# Patient Record
Sex: MALE | Race: WHITE | URBAN - METROPOLITAN AREA
[De-identification: names, ages, dates, MRNs, and addresses within clinical notes are randomized per-mention and may not be internally consistent; named-entity substitution may affect disease eponyms.]

---

## 2021-08-10 ENCOUNTER — APPOINTMENT (RX ONLY)
Dept: URBAN - METROPOLITAN AREA CLINIC 123 | Facility: CLINIC | Age: 76
Setting detail: DERMATOLOGY
End: 2021-08-10

## 2021-08-10 DIAGNOSIS — L21.8 OTHER SEBORRHEIC DERMATITIS: ICD-10-CM

## 2021-08-10 DIAGNOSIS — L82.1 OTHER SEBORRHEIC KERATOSIS: ICD-10-CM

## 2021-08-10 DIAGNOSIS — D22 MELANOCYTIC NEVI: ICD-10-CM

## 2021-08-10 DIAGNOSIS — L85.3 XEROSIS CUTIS: ICD-10-CM

## 2021-08-10 DIAGNOSIS — D18.0 HEMANGIOMA: ICD-10-CM

## 2021-08-10 DIAGNOSIS — L57.0 ACTINIC KERATOSIS: ICD-10-CM

## 2021-08-10 DIAGNOSIS — L81.4 OTHER MELANIN HYPERPIGMENTATION: ICD-10-CM | Status: STABLE

## 2021-08-10 DIAGNOSIS — L40.0 PSORIASIS VULGARIS: ICD-10-CM

## 2021-08-10 PROBLEM — D22.9 MELANOCYTIC NEVI, UNSPECIFIED: Status: ACTIVE | Noted: 2021-08-10

## 2021-08-10 PROBLEM — D18.01 HEMANGIOMA OF SKIN AND SUBCUTANEOUS TISSUE: Status: ACTIVE | Noted: 2021-08-10

## 2021-08-10 PROCEDURE — ? SUNSCREEN RECOMMENDATIONS

## 2021-08-10 PROCEDURE — ? COUNSELING

## 2021-08-10 PROCEDURE — 99204 OFFICE O/P NEW MOD 45 MIN: CPT | Mod: 25

## 2021-08-10 PROCEDURE — ? PRESCRIPTION

## 2021-08-10 PROCEDURE — ? ADDITIONAL NOTES

## 2021-08-10 PROCEDURE — ? LIQUID NITROGEN

## 2021-08-10 PROCEDURE — ? FULL BODY SKIN EXAM

## 2021-08-10 PROCEDURE — 17004 DESTROY PREMAL LESIONS 15/>: CPT

## 2021-08-10 RX ORDER — HYDROCORTISONE 25 MG/G
CREAM TOPICAL BID
Qty: 1 | Refills: 6 | Status: ERX | COMMUNITY
Start: 2021-08-10

## 2021-08-10 RX ORDER — BETAMETHASONE DIPROPIONATE 0.5 MG/G
CREAM, AUGMENTED TOPICAL BID
Qty: 2 | Refills: 5 | Status: ERX | COMMUNITY
Start: 2021-08-10

## 2021-08-10 RX ORDER — KETOCONAZOLE 20 MG/G
CREAM TOPICAL BID
Qty: 1 | Refills: 11 | Status: ERX | COMMUNITY
Start: 2021-08-10

## 2021-08-10 RX ADMIN — HYDROCORTISONE: 25 CREAM TOPICAL at 00:00

## 2021-08-10 RX ADMIN — BETAMETHASONE DIPROPIONATE: 0.5 CREAM, AUGMENTED TOPICAL at 00:00

## 2021-08-10 RX ADMIN — KETOCONAZOLE: 20 CREAM TOPICAL at 00:00

## 2021-08-10 ASSESSMENT — LOCATION DETAILED DESCRIPTION DERM
LOCATION DETAILED: LEFT CENTRAL PARIETAL SCALP
LOCATION DETAILED: LEFT POSTERIOR SHOULDER
LOCATION DETAILED: RIGHT POSTERIOR PARIETAL SCALP
LOCATION DETAILED: MID-OCCIPITAL SCALP
LOCATION DETAILED: LEFT SUPERIOR PARIETAL SCALP
LOCATION DETAILED: RIGHT SUPERIOR MEDIAL FOREHEAD
LOCATION DETAILED: RIGHT RIB CAGE
LOCATION DETAILED: LEFT SUPERIOR FOREHEAD
LOCATION DETAILED: RIGHT SUPERIOR OCCIPITAL SCALP
LOCATION DETAILED: LEFT SUPERIOR MEDIAL FOREHEAD
LOCATION DETAILED: RIGHT PROXIMAL DORSAL FOREARM
LOCATION DETAILED: SUPERIOR MID FOREHEAD
LOCATION DETAILED: RIGHT SUPERIOR PARIETAL SCALP
LOCATION DETAILED: RIGHT MID-UPPER BACK
LOCATION DETAILED: LEFT ELBOW
LOCATION DETAILED: GLABELLA
LOCATION DETAILED: LEFT MEDIAL MALAR CHEEK

## 2021-08-10 ASSESSMENT — LOCATION ZONE DERM
LOCATION ZONE: SCALP
LOCATION ZONE: FACE
LOCATION ZONE: TRUNK
LOCATION ZONE: ARM

## 2021-08-10 ASSESSMENT — LOCATION SIMPLE DESCRIPTION DERM
LOCATION SIMPLE: RIGHT FOREHEAD
LOCATION SIMPLE: LEFT ELBOW
LOCATION SIMPLE: SCALP
LOCATION SIMPLE: RIGHT OCCIPITAL SCALP
LOCATION SIMPLE: RIGHT UPPER BACK
LOCATION SIMPLE: GLABELLA
LOCATION SIMPLE: LEFT SHOULDER
LOCATION SIMPLE: POSTERIOR SCALP
LOCATION SIMPLE: RIGHT FOREARM
LOCATION SIMPLE: SUPERIOR FOREHEAD
LOCATION SIMPLE: ABDOMEN
LOCATION SIMPLE: LEFT FOREHEAD
LOCATION SIMPLE: LEFT CHEEK

## 2021-08-10 NOTE — PROCEDURE: LIQUID NITROGEN
Number Of Freeze-Thaw Cycles: 3 freeze-thaw cycles
Render Note In Bullet Format When Appropriate: No
Show Applicator Variable?: Yes
Duration Of Freeze Thaw-Cycle (Seconds): 8
Detail Level: Detailed
Consent: The patient's consent was obtained including but not limited to risks of crusting, scabbing, blistering, scarring, darker or lighter pigmentary change, recurrence, incomplete removal and infection.
Post-Care Instructions: I reviewed with the patient in detail post-care instructions. Patient is to wear sunprotection, and avoid picking at any of the treated lesions. Pt may apply Vaseline to crusted or scabbing areas.

## 2022-02-09 ENCOUNTER — APPOINTMENT (RX ONLY)
Dept: URBAN - METROPOLITAN AREA CLINIC 123 | Facility: CLINIC | Age: 77
Setting detail: DERMATOLOGY
End: 2022-02-09

## 2022-02-09 DIAGNOSIS — D18.0 HEMANGIOMA: ICD-10-CM

## 2022-02-09 DIAGNOSIS — L81.4 OTHER MELANIN HYPERPIGMENTATION: ICD-10-CM | Status: STABLE

## 2022-02-09 DIAGNOSIS — L21.8 OTHER SEBORRHEIC DERMATITIS: ICD-10-CM | Status: WELL CONTROLLED

## 2022-02-09 DIAGNOSIS — L85.3 XEROSIS CUTIS: ICD-10-CM

## 2022-02-09 DIAGNOSIS — D22 MELANOCYTIC NEVI: ICD-10-CM

## 2022-02-09 DIAGNOSIS — L82.1 OTHER SEBORRHEIC KERATOSIS: ICD-10-CM

## 2022-02-09 DIAGNOSIS — L40.0 PSORIASIS VULGARIS: ICD-10-CM | Status: WELL CONTROLLED

## 2022-02-09 PROBLEM — D22.9 MELANOCYTIC NEVI, UNSPECIFIED: Status: ACTIVE | Noted: 2022-02-09

## 2022-02-09 PROBLEM — D18.01 HEMANGIOMA OF SKIN AND SUBCUTANEOUS TISSUE: Status: ACTIVE | Noted: 2022-02-09

## 2022-02-09 PROCEDURE — ? PRESCRIPTION MEDICATION MANAGEMENT

## 2022-02-09 PROCEDURE — ? FULL BODY SKIN EXAM

## 2022-02-09 PROCEDURE — ? COUNSELING

## 2022-02-09 PROCEDURE — ? ADDITIONAL NOTES

## 2022-02-09 PROCEDURE — ? SUNSCREEN RECOMMENDATIONS

## 2022-02-09 PROCEDURE — ? MEDICATION COUNSELING

## 2022-02-09 PROCEDURE — 99214 OFFICE O/P EST MOD 30 MIN: CPT

## 2022-02-09 ASSESSMENT — LOCATION SIMPLE DESCRIPTION DERM
LOCATION SIMPLE: RIGHT HAND
LOCATION SIMPLE: LEFT HAND
LOCATION SIMPLE: LEFT SHOULDER
LOCATION SIMPLE: GLABELLA
LOCATION SIMPLE: RIGHT UPPER BACK
LOCATION SIMPLE: ABDOMEN
LOCATION SIMPLE: LEFT CHEEK

## 2022-02-09 ASSESSMENT — LOCATION ZONE DERM
LOCATION ZONE: TRUNK
LOCATION ZONE: FACE
LOCATION ZONE: ARM
LOCATION ZONE: HAND

## 2022-02-09 ASSESSMENT — LOCATION DETAILED DESCRIPTION DERM
LOCATION DETAILED: GLABELLA
LOCATION DETAILED: RIGHT RIB CAGE
LOCATION DETAILED: RIGHT MID-UPPER BACK
LOCATION DETAILED: LEFT POSTERIOR SHOULDER
LOCATION DETAILED: 2ND WEB SPACE RIGHT HAND
LOCATION DETAILED: LEFT MEDIAL MALAR CHEEK
LOCATION DETAILED: LEFT DORSAL MIDDLE METACARPOPHALANGEAL JOINT

## 2022-02-09 NOTE — PROCEDURE: MEDICATION COUNSELING
Xeljessiez Pregnancy And Lactation Text: This medication is Pregnancy Category D and is not considered safe during pregnancy.  The risk during breast feeding is also uncertain. Xelejssiez Pregnancy And Lactation Text: This medication is Pregnancy Category D and is not considered safe during pregnancy.  The risk during breast feeding is also uncertain.

## 2022-02-09 NOTE — PROCEDURE: PRESCRIPTION MEDICATION MANAGEMENT
Continue Regimen: Augmented Betamethasone
Detail Level: Zone
Render In Strict Bullet Format?: No
Continue Regimen: Ketoconazole and Hydrocortisone Cream

## 2022-08-09 ENCOUNTER — APPOINTMENT (RX ONLY)
Dept: URBAN - METROPOLITAN AREA CLINIC 123 | Facility: CLINIC | Age: 77
Setting detail: DERMATOLOGY
End: 2022-08-09

## 2022-08-09 DIAGNOSIS — L40.0 PSORIASIS VULGARIS: ICD-10-CM | Status: WELL CONTROLLED

## 2022-08-09 DIAGNOSIS — L21.8 OTHER SEBORRHEIC DERMATITIS: ICD-10-CM

## 2022-08-09 PROCEDURE — ? COUNSELING

## 2022-08-09 PROCEDURE — ? MEDICATION COUNSELING

## 2022-08-09 PROCEDURE — ? PRESCRIPTION MEDICATION MANAGEMENT

## 2022-08-09 PROCEDURE — 99214 OFFICE O/P EST MOD 30 MIN: CPT

## 2022-08-09 PROCEDURE — ? ADDITIONAL NOTES

## 2022-08-09 ASSESSMENT — LOCATION SIMPLE DESCRIPTION DERM
LOCATION SIMPLE: LEFT HAND
LOCATION SIMPLE: RIGHT HAND

## 2022-08-09 ASSESSMENT — LOCATION DETAILED DESCRIPTION DERM
LOCATION DETAILED: 2ND WEB SPACE RIGHT HAND
LOCATION DETAILED: LEFT DORSAL MIDDLE METACARPOPHALANGEAL JOINT

## 2022-08-09 ASSESSMENT — LOCATION ZONE DERM: LOCATION ZONE: HAND

## 2022-08-09 NOTE — PROCEDURE: ADDITIONAL NOTES
Detail Level: Simple
Additional Notes: Next time pt needs refill prescribe ointment instead of cream
Render Risk Assessment In Note?: no

## 2022-08-09 NOTE — PROCEDURE: MEDICATION COUNSELING
After Visit Summary      Facility     Name Address Phone       [facility] [address] [facility] [address] [phone]            Patient Discharge Summary   4/25/2017    Kurt Mukherjee            Please bring this medication reconciliation form to your next doctor’s appointment(s). Please update the form if you stop taking any of these medications or you start taking any new medications including over the counter medications. Also please carry a copy of this form with you at all times in the event of an emergency.    A copy of these discharge instructions was reviewed with and given to the patient/patient representative/Guardian/Caregiver at discharge.          The doctor who took care of you in the hospital     Provider Specialty    David Gomes MD Emergency Medicine    Marck Christianson DO Hospitalist    Yenifer Farfan MD Internal Medicine    Wilber Francisco MD Internal Medicine      Allergies as of 4/29/2017        Reactions    Beans ANAPHYLAXIS    Bee Sting ANAPHYLAXIS    Mushroom ANAPHYLAXIS    Penicillins RASH           Discharge Medications              What to Do with Your Medications      START taking these medications today unless otherwise stated        Details    Morning Noon Evening Bedtime As needed    ALPRAZolam 1 MG tablet   Commonly known as:  XANAX        Take 1 tablet by mouth every 6 hours as needed for Sleep or Anxiety.   Authorizing Provider:  Wilber Sanchezguin   Last Dose:  1 mg on 4/29/2017 11:04 AM                               docusate sodium-sennosides 50-8.6 MG per tablet   Commonly known as:  SENOKOT S        Take 2 tablets by mouth daily as needed for Constipation.   Authorizing Provider:  Wilber Sanchezguin   Last Dose:  2 tablets on 4/28/2017 11:18 PM                               gabapentin 300 MG capsule   Commonly known as:  NEURONTIN        Take 1 capsule by mouth 2 times daily.   Authorizing  Provider:  Wilber Sanchezguin   Last Dose:  300 mg on 4/29/2017  1:51 PM                                  hydrochlorothiazide 25 MG tablet   Commonly known as:  HYDRODIURIL        Take 1 tablet by mouth daily.   Authorizing Provider:  Wilber Sanchezguin   Start Date:  4/30/2017                               HYDROcodone-acetaminophen 5-325 MG per tablet   Commonly known as:  NORCO        Take 1-2 tablets by mouth every 4 hours as needed for Pain.   Authorizing Provider:  Wilber Sanchezguin   Last Dose:  2 tablets on 4/29/2017  1:51 PM                               ondansetron 4 MG tablet   Commonly known as:  ZOFRAN        Take 1 tablet by mouth every 8 hours as needed for Nausea.   Authorizing Provider:  Wilber Francisco                               vitamin - therapeutic multivitamins w/minerals Tab   Commonly known as:  CENTRUM SILVER,THERA-M        Take 1 tablet by mouth daily.   Authorizing Provider:  Wilber Sanchezguin   Last Dose:  1 tablet on 4/29/2017  9:01 AM                                                        CONTINUE taking these medications which have CHANGED        Details    Morning Noon Evening Bedtime As needed    amLODIPine 5 MG tablet   Commonly known as:  NORVASC   What changed:  how much to take        Take 2 tablets by mouth daily.   Authorizing Provider:  Wilber Sanchezguin   Last Dose:  10 mg on 4/29/2017  9:01 AM                                                        CONTINUE taking these medications which have NOT CHANGED        Details    Morning Noon Evening Bedtime As needed    acetaminophen 500 MG tablet   Commonly known as:  TYLENOL        Take 500-1,000 mg by mouth as needed for Pain. Dose: 1-2 tablets (=500-1,000 mg)                               ibuprofen 600 MG tablet   Commonly known as:  MOTRIN        Take 600 mg by mouth as needed for Pain.                                                           Where to Get Your Medications      These medications were sent to [id number] -  [place], [place] - [address] [place]  [address] [place] [address]    Hours:  M-F 8-10,SA 9-6,GAR 10-6 Phone:  [phone]     amLODIPine 5 MG tablet    docusate sodium-sennosides 50-8.6 MG per tablet    ondansetron 4 MG tablet    vitamin - therapeutic multivitamins w/minerals Tab         You are receiving a paper prescription for the following medications, you can take these to any pharmacy.     Bring a paper prescription for each of these medications     ALPRAZolam 1 MG tablet    gabapentin 300 MG capsule    hydrochlorothiazide 25 MG tablet    HYDROcodone-acetaminophen 5-325 MG per tablet               Your To Do List     Future Appointments Provider Department Dept Phone    2/7/2019 3:15 PM Maite Davison MD [facility] [phone]        Discharge Instructions       MEDICATIONS:  · See Medication List (bring to your doctor appointments).  · Other:  Scripts to be delivered to pt prior to discharge.    VACCINES:  Most Recent Immunizations   Administered Date(s) Administered   • Tdap 11/09/2016       ACTIVITY:  · Continue activity as you were in the hospital, slowly increase to what you were doing previously.  · Up as desired / no restrictions.  · Refrain from consuming alcohol.    SMOKING:  · Avoid all tobacco products and secondhand smoke.  · Smoking Cessation Counseling offered.  · [place] Toll Free Quit Line: [phone]    DIET:  · Limit salt and salty foods unless told otherwise by your doctor.  · Other: Soft diet    · Trouble breathing or chest pain - CALL 911    CONTACT YOUR DOCTOR IF:  · You have symptoms that are not \"normal\" for you.  · You have new or worse symptoms or pain, not relieved by medicine or rest.  · Temperature greater than 101 degrees F, chills or flu like symptoms.  · You gain more than 3 pounds in 2 days.    If you would like to see a psychiatrist please call to schedule an appt at [phone].  [facility] treatment as  outpatient        Discharge References/Attachments     None      Pending Labs/Results     Any lab or diagnostic test results that are \"pending\" at time of discharge are listed below. If no results display then none were \"pending\" at time of discharge. Depending on when the test was completed results may be available within 3-5 days. Results can be reviewed with your provider at your next visits or through [employer]. If needed contact the provider office for additional information.         Additional Instructions    Should you have any behavioral health concerns after discharge  - resources that may help are listed below:   Mental Health Resources:  [facility] Mental Health Crisis Line [phone]  Crisis Resource Center [phone] / [phone]  Ambulance / Police 911  Child / Adolescent Crisis Team [phone]  Older Adult Crisis Team [phone]  Poison Control [phone]  Alcohol and Drug Treatment:  [facility] [phone]  Alcoholics Anonymous [phone] (Japanese [phone])  [facility] [address] [phone]         Patient Portal Signup     Manage health care for you and your family anytime, anywhere with the new [employer], your free online resource for quick and easy access to personal health information, scheduling appointments, refilling prescriptions, viewing test results, paying bills and more.  Sign up for a free and secure account. Please follow the instructions below to securely complete your enrollment.     1. Go to [email or web address]  2. Click Sign Up Now   3. Enter the Activation Code when prompted     Activation Code: [email or web address]  Expires: 5/25/2017  9:48 AM    If you have questions related to [facility], you can email [email or web address] or call [phone] to talk to our [facility] staff.  For questions related to your health, contact your physician’s office.  Please remember to dial 911 for medical emergencies.         Your Opinion Matters To Us  If you receive a patient satisfaction  survey in the mail, please complete and return it in the postage-paid envelope.    We truly value and appreciate your feedback.           Kurt Mukherjee        Your To Do List     Future Appointments Provider Department Dept Phone    2/7/2019 3:15 PM Maite Davison MD [facility] [phone]      Contact your doctor for follow-up appointment if not already scheduled.     Follow up with KAELYN Martinez In 1 week.    Specialty:  Nurse Practitioner - Family    Contact information    [address] [place] [address]  [phone]           Kurt Mukherjee           Summary of your Discharge Medications      Take these Medications        Details    Morning Noon Evening Bedtime As needed    acetaminophen 500 MG tablet   Commonly known as:  TYLENOL    Take 500-1,000 mg by mouth as needed for Pain. Dose: 1-2 tablets (=500-1,000 mg)                               ALPRAZolam 1 MG tablet   Commonly known as:  XANAX    Take 1 tablet by mouth every 6 hours as needed for Sleep or Anxiety.                               amLODIPine 5 MG tablet   Commonly known as:  NORVASC    Take 2 tablets by mouth daily.                               docusate sodium-sennosides 50-8.6 MG per tablet   Commonly known as:  SENOKOT S    Take 2 tablets by mouth daily as needed for Constipation.                               gabapentin 300 MG capsule   Commonly known as:  NEURONTIN    Take 1 capsule by mouth 2 times daily.                                  hydrochlorothiazide 25 MG tablet   Commonly known as:  HYDRODIURIL   Start taking on:  4/30/2017    Take 1 tablet by mouth daily.                               HYDROcodone-acetaminophen 5-325 MG per tablet   Commonly known as:  NORCO    Take 1-2 tablets by mouth every 4 hours as needed for Pain.                               ibuprofen 600 MG tablet   Commonly known as:  MOTRIN    Take 600 mg by mouth as needed for Pain.                               ondansetron 4  MG tablet   Commonly known as:  ZOFRAN    Take 1 tablet by mouth every 8 hours as needed for Nausea.                               vitamin - therapeutic multivitamins w/minerals Tab   Commonly known as:  CENTRUM SILVER,THERA-M    Take 1 tablet by mouth daily.                                                             Outpatient Administered Medication List      Notice     You have not been prescribed any facility-administered medications.       Oxybutynin Counseling:  I discussed with the patient the risks of oxybutynin including but not limited to skin rash, drowsiness, dry mouth, difficulty urinating, and blurred vision.

## 2022-08-09 NOTE — PROCEDURE: MEDICATION COUNSELING
CAPILLARY BLOOD GLUCOSE      POCT Blood Glucose.: 192 mg/dL (14 Jan 2021 21:14)  POCT Blood Glucose.: 195 mg/dL (14 Jan 2021 16:40)  POCT Blood Glucose.: 428 mg/dL (14 Jan 2021 11:46)      Vital Signs Last 24 Hrs  T(C): 36.7 (15 Zuhair 2021 05:08), Max: 36.7 (15 Zuhair 2021 05:08)  T(F): 98 (15 Zuhair 2021 05:08), Max: 98 (15 Zuhair 2021 05:08)  HR: 86 (15 Zuhair 2021 05:08) (69 - 86)  BP: 120/60 (15 Zuhair 2021 05:08) (119/72 - 130/72)  BP(mean): --  RR: 18 (15 Zuhair 2021 05:08) (17 - 18)  SpO2: 95% (15 Zuhair 2021 05:08) (91% - 95%)     01-14    139  |  102  |  17  ----------------------------<  306<H>  4.4   |  31  |  0.87    Ca    8.7      14 Jan 2021 07:50  Mg     1.8     01-14    TPro  7.1  /  Alb  2.7<L>  /  TBili  0.4  /  DBili  .10  /  AST  30  /  ALT  46  /  AlkPhos  51  01-14      atorvastatin 20 milliGRAM(s) Oral at bedtime  dexAMETHasone  Injectable 6 milliGRAM(s) IV Push daily  dextrose 40% Gel 15 Gram(s) Oral once  dextrose 50% Injectable 25 Gram(s) IV Push once  dextrose 50% Injectable 12.5 Gram(s) IV Push once  dextrose 50% Injectable 25 Gram(s) IV Push once  glucagon  Injectable 1 milliGRAM(s) IntraMuscular once  insulin glargine Injectable (LANTUS) 25 Unit(s) SubCutaneous every morning  insulin lispro (ADMELOG) corrective regimen sliding scale   SubCutaneous three times a day before meals  insulin lispro (ADMELOG) corrective regimen sliding scale   SubCutaneous at bedtime  insulin lispro Injectable (ADMELOG) 13 Unit(s) SubCutaneous three times a day before meals   Sarecycline Counseling: Patient advised regarding possible photosensitivity and discoloration of the teeth, skin, lips, tongue and gums.  Patient instructed to avoid sunlight, if possible.  When exposed to sunlight, patients should wear protective clothing, sunglasses, and sunscreen.  The patient was instructed to call the office immediately if the following severe adverse effects occur:  hearing changes, easy bruising/bleeding, severe headache, or vision changes.  The patient verbalized understanding of the proper use and possible adverse effects of sarecycline.  All of the patient's questions and concerns were addressed.

## 2022-08-09 NOTE — PROCEDURE: MEDICATION COUNSELING
Speaking Coherently Isotretinoin Pregnancy And Lactation Text: This medication is Pregnancy Category X and is considered extremely dangerous during pregnancy. It is unknown if it is excreted in breast milk.

## 2022-08-09 NOTE — PROCEDURE: PRESCRIPTION MEDICATION MANAGEMENT
Continue Regimen: Augmented Betamethasone
Detail Level: Zone
Render In Strict Bullet Format?: No
Plan: Still has keto and HC 2.5%. Uses off/on.

## 2022-08-09 NOTE — PROCEDURE: MEDICATION COUNSELING
Mother called yesterday and asked if a medication in school form and asthma action plan could be faxed to school at [phone]  RN called mother today and l/m that a medication in school form was faxed last week and the asthma action plan will be up-dated when patient is seen on 9/7/2021 and can be faxed at that time  Please call back with any questions  5-Fu Counseling: 5-Fluorouracil Counseling:  I discussed with the patient the risks of 5-fluorouracil including but not limited to erythema, scaling, itching, weeping, crusting, and pain.

## 2022-08-09 NOTE — PROCEDURE: MEDICATION COUNSELING
37.6 Minocycline Counseling: Patient advised regarding possible photosensitivity and discoloration of the teeth, skin, lips, tongue and gums.  Patient instructed to avoid sunlight, if possible.  When exposed to sunlight, patients should wear protective clothing, sunglasses, and sunscreen.  The patient was instructed to call the office immediately if the following severe adverse effects occur:  hearing changes, easy bruising/bleeding, severe headache, or vision changes.  The patient verbalized understanding of the proper use and possible adverse effects of minocycline.  All of the patient's questions and concerns were addressed.

## 2023-02-20 ENCOUNTER — APPOINTMENT (RX ONLY)
Dept: URBAN - METROPOLITAN AREA CLINIC 123 | Facility: CLINIC | Age: 78
Setting detail: DERMATOLOGY
End: 2023-02-20

## 2023-02-20 DIAGNOSIS — L82.1 OTHER SEBORRHEIC KERATOSIS: ICD-10-CM

## 2023-02-20 DIAGNOSIS — L40.0 PSORIASIS VULGARIS: ICD-10-CM

## 2023-02-20 DIAGNOSIS — L81.4 OTHER MELANIN HYPERPIGMENTATION: ICD-10-CM

## 2023-02-20 DIAGNOSIS — D22 MELANOCYTIC NEVI: ICD-10-CM

## 2023-02-20 DIAGNOSIS — L85.3 XEROSIS CUTIS: ICD-10-CM

## 2023-02-20 DIAGNOSIS — D18.0 HEMANGIOMA: ICD-10-CM

## 2023-02-20 DIAGNOSIS — L21.8 OTHER SEBORRHEIC DERMATITIS: ICD-10-CM

## 2023-02-20 PROBLEM — D18.01 HEMANGIOMA OF SKIN AND SUBCUTANEOUS TISSUE: Status: ACTIVE | Noted: 2023-02-20

## 2023-02-20 PROBLEM — D22.9 MELANOCYTIC NEVI, UNSPECIFIED: Status: ACTIVE | Noted: 2023-02-20

## 2023-02-20 PROCEDURE — ? PRESCRIPTION

## 2023-02-20 PROCEDURE — ? SUNSCREEN RECOMMENDATIONS

## 2023-02-20 PROCEDURE — ? PRESCRIPTION MEDICATION MANAGEMENT

## 2023-02-20 PROCEDURE — ? MEDICATION COUNSELING

## 2023-02-20 PROCEDURE — ? COUNSELING

## 2023-02-20 PROCEDURE — ? FULL BODY SKIN EXAM

## 2023-02-20 PROCEDURE — 99214 OFFICE O/P EST MOD 30 MIN: CPT

## 2023-02-20 PROCEDURE — ? ADDITIONAL NOTES

## 2023-02-20 RX ORDER — TAPINAROF 10 MG/1000MG
CREAM TOPICAL
Qty: 60 | Refills: 8 | Status: ERX | COMMUNITY
Start: 2023-02-20

## 2023-02-20 RX ADMIN — TAPINAROF: 10 CREAM TOPICAL at 00:00

## 2023-02-20 ASSESSMENT — LOCATION ZONE DERM
LOCATION ZONE: FACE
LOCATION ZONE: TRUNK
LOCATION ZONE: HAND
LOCATION ZONE: ARM

## 2023-02-20 ASSESSMENT — LOCATION DETAILED DESCRIPTION DERM
LOCATION DETAILED: RIGHT MID-UPPER BACK
LOCATION DETAILED: 2ND WEB SPACE RIGHT HAND
LOCATION DETAILED: GLABELLA
LOCATION DETAILED: LEFT MEDIAL MALAR CHEEK
LOCATION DETAILED: RIGHT RIB CAGE
LOCATION DETAILED: LEFT DORSAL MIDDLE METACARPOPHALANGEAL JOINT
LOCATION DETAILED: LEFT POSTERIOR SHOULDER

## 2023-02-20 ASSESSMENT — LOCATION SIMPLE DESCRIPTION DERM
LOCATION SIMPLE: LEFT CHEEK
LOCATION SIMPLE: ABDOMEN
LOCATION SIMPLE: GLABELLA
LOCATION SIMPLE: LEFT SHOULDER
LOCATION SIMPLE: LEFT HAND
LOCATION SIMPLE: RIGHT HAND
LOCATION SIMPLE: RIGHT UPPER BACK

## 2023-02-20 NOTE — PROCEDURE: MEDICATION COUNSELING
Agree with present plan   Needs MD appointment    Dutasteride Pregnancy And Lactation Text: This medication is absolutely contraindicated in women, especially during pregnancy and breast feeding. Feminization of male fetuses is possible if taking while pregnant.

## 2023-02-20 NOTE — PROCEDURE: MEDICATION COUNSELING
[address] [facility] ED  EMERGENCY DEPARTMENT ENCOUNTER      Pt Name: Britta Wick  MRN: [id number]  [place] 10/15/1933  Date of evaluation: 12/30/2022  Provider: Miriam Arellano DO    CHIEF COMPLAINT       Chief Complaint   Patient presents with    Illness     BROUGHT IN TO ER VIA [facility] FROM HOME          HISTORY OF PRESENT ILLNESS   (Location/Symptom, Timing/Onset, Context/Setting, Quality, Duration, Modifying Factors, Severity)  Note limiting factors. Britta Wick is a 79 y.o. male who presents to the emergency department . Brought in for general weakness and sleeping more often. Patient has Parkinson's disease. Generally walks with a walker. He states that his family would not let him walk today. Patient has no complaints of pain. Daughter noticed him breathing more from his belly. No cough or congestion fevers or chills. Eating and drinking as usual.  History of hyperlipidemia, hypertension Parkinson's    HPI    Nursing Notes were reviewed. REVIEW OF SYSTEMS    (2-9 systems for level 4, 10 or more for level 5)     Review of Systems   Constitutional:  Positive for fatigue. Negative for activity change, appetite change and fever. HENT:  Negative for congestion and sore throat. Eyes:  Negative for pain and visual disturbance. Respiratory:  Negative for chest tightness and shortness of breath. Cardiovascular:  Negative for chest pain. Gastrointestinal:  Negative for abdominal pain, nausea and vomiting. Endocrine: Negative for polydipsia. Genitourinary:  Negative for flank pain and urgency. Musculoskeletal:  Positive for gait problem. Negative for neck stiffness. Skin:  Negative for rash. Neurological:  Positive for weakness. Negative for light-headedness and headaches. Psychiatric/Behavioral:  Negative for confusion and sleep disturbance. Except as noted above the remainder of the review of systems was reviewed and negative.        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 Left 10/2017    left knee    UPPER GASTROINTESTINAL ENDOSCOPY N/A 9/11/2019    EGD ESOPHAGOGASTRODUODENOSCOPY performed by Michael Karimi MD at [address] [facility] Right 2015         CURRENT MEDICATIONS       Previous Medications    AMLODIPINE (NORVASC) 5 MG TABLET    take 1/2 tablet twice a day as needed if diastolic above 880. CARBIDOPA-LEVODOPA (SINEMET)  MG PER TABLET    Take 1.5 tablets by mouth in the morning and 1.5 tablets at noon and 1.5 tablets in the evening and 1.5 tablets before bedtime. [id number].    DONEPEZIL (ARICEPT) 10 MG TABLET    Take 1 tablet by mouth nightly    FINASTERIDE (PROSCAR) 5 MG TABLET    Take 1 tablet by mouth daily    MELATONIN 3 MG TABS TABLET    Take 1 tablet by mouth nightly    MULTIPLE VITAMINS-MINERALS (CENTRUM SILVER PO)    Take by mouth With NO IRON!     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   Tobacco Use    Smoking status: Never    Smokeless tobacco: Never   Vaping Use    Vaping Use: Never used   Substance and Sexual Activity    Alcohol use: No    Drug use: No    Sexual activity: Not Currently   Social History Narrative    Lives With: Oleg Wild still drives (and dtr - still works)    [facility] [address] on Main level with bedroom/bathroom    Home Access: Stairs to enter with rails    Entrance Stairs - Number of Steps: 3    Entrance Stairs - Rails: Both    Bathroom Shower/Tub: Walk-in shower    Bathroom Equipment: Shower chair, Built-in shower seat    Home Equipment: Rolling walker, 4 wheeled walker, [employer] Help From: Family (dtr works for schools), hired help. ADL Assistance: Needs assistance (assistance with bathing)    Homemaking Assistance: Independent    Homemaking Responsibilities: Yes    Ambulation Assistance: Independent (2ww)    Transfer Assistance: Independent    Active : No     Social Determinants of Health     Financial Resource Strain: Low Risk     Difficulty of Paying Living Expenses: Not hard at all   Food Insecurity: No Food Insecurity    Worried About [address] in the Last Year: Never true    [facility] in the Last Year: Never true       SCREENINGS        [place] Coma Scale  Eye Opening: Spontaneous  Best Verbal Response: Oriented  Best Motor Response: Obeys commands  [place] Coma Scale Score: 15               PHYSICAL EXAM    (up to 7 for level 4, 8 or more for level 5)     ED Triage Vitals [12/30/22 1321]   BP Temp Temp Source Heart Rate Resp SpO2 Height Weight   (!) 161/65 97.6 °F (36.4 °C) Oral 66 16 98 % -- --       Physical Exam  Vitals and nursing note reviewed. Constitutional:       General: He is not in acute distress. Appearance: He is well-developed. He is not diaphoretic. HENT:      Head: Normocephalic and atraumatic.       Right Ear: External ear normal.      Left Ear: External ear normal.      Nose: Nose normal.      Mouth/Throat:      Mouth: Mucous membranes are moist.      Pharynx: No oropharyngeal exudate. Eyes:      Extraocular Movements: Extraocular movements intact. Conjunctiva/sclera: Conjunctivae normal.      Pupils: Pupils are equal, round, and reactive to light. Neck:      Thyroid: No thyromegaly. Vascular: No JVD. Trachea: No tracheal deviation. Cardiovascular:      Rate and Rhythm: Normal rate and regular rhythm. Heart sounds: Normal heart sounds. No murmur heard. Comments: With PVCs  Pulmonary:      Effort: Pulmonary effort is normal. No respiratory distress. Breath sounds: Normal breath sounds. No wheezing. Abdominal:      General: Bowel sounds are normal.      Palpations: Abdomen is soft. Tenderness: There is no abdominal tenderness. There is no guarding. Genitourinary:     Rectum: Guaiac result negative. Musculoskeletal:         General: Normal range of motion. Cervical back: Normal range of motion and neck supple. Right lower leg: No edema. Left lower leg: No edema. Skin:     General: Skin is warm and dry. Findings: No rash. Neurological:      Mental Status: He is alert and oriented to person, place, and time. Cranial Nerves: No cranial nerve deficit. Psychiatric:         Behavior: Behavior normal.       DIAGNOSTIC RESULTS     EKG: All EKG's are interpreted by the Emergency Department Physician who either signs or Co-signs this chart in the absence of a cardiologist.    Normal sinus rhythm 61 bpm no acute ischemia or ectopy    RADIOLOGY:   Non-plain film images such as CT, Ultrasound and MRI are read by the radiologist. Plain radiographic images are visualized and preliminarily interpreted by the emergency physician with the below findings:        Interpretation per the Radiologist below, if available at the time of this note:    XR CHEST PORTABLE   Final Result   Bilateral lower lung, atelectasis/pneumonia as discussed.                ED BEDSIDE ULTRASOUND:   Performed by ED Physician - none    LABS:  Labs Reviewed   TROPONIN - Abnormal; Notable for the following components:       Result Value    Troponin 0.038 (*)     All other components within normal limits    Narrative:     Nora Box  [facility] tel. [phone],  TROP results called to and read back by Joe Ahuja, 12/30/2022 14:41, by  Hilary Mayorga - Abnormal; Notable for the following components:    Protein, UA TRACE (*)     All other components within normal limits   COMPREHENSIVE METABOLIC PANEL - Abnormal; Notable for the following components:    BUN 36 (*)     Creatinine 1.66 (*)     Est, Glom Filt Rate 38.9 (*)     Total Protein 6.1 (*)     Albumin 3.2 (*)     All other components within normal limits    Narrative:     CALL  [place]  [facility] tel. [phone],  TROP results called to and read back by Joe Ahuja, 12/30/2022 14:41, by  Dioni Hoyt   CBC WITH AUTO DIFFERENTIAL - Abnormal; Notable for the following components:    RBC 2.51 (*)     Hemoglobin 7.9 (*)     Hematocrit 24.8 (*)     MCV 99.2 (*)     MCH 31.6 (*)     MCHC 31.9 (*)     RDW 15.5 (*)     All other components within normal limits   COVID-19, RAPID   TSH    Narrative:     CALL  [place]  [facility] tel. [phone],  TROP results called to and read back by Joe Ahuja, 12/30/2022 14:41, by  [address] [place] [address]    Narrative:     Bennie Fragoso tel. [phone],  TROP results called to and read back by Joe Ahuja, 12/30/2022 14:41, by  Dioni Hoyt   PROCALCITONIN   LACTIC ACID       All other labs were within normal range or not returned as of this dictation. EMERGENCY DEPARTMENT COURSE and DIFFERENTIAL DIAGNOSIS/MDM:   Vitals:    Vitals:    12/30/22 1321 12/30/22 1459 12/30/22 1656   BP: (!) 161/65 (!) 157/68    Pulse: 66 65    Resp: 16 16    Temp: 97.6 °F (36.4 °C)     TempSrc: Oral     SpO2: 98% 98%    Weight:   166 lb (75.3 kg)   Height:   5' 10\" (1.778 m)       Patient with Parkinson's disease has had worsening weakness over the last 2 days. Today he cannot walk.   Negative for COVID or influenza. Hemoglobin dropped from 10-7.9 but he is heme-negative. Chest x-ray does not show any pneumonia. Family states that he is becoming more more weak with his Parkinson's anyways. Because patient cannot stand or walk at this time he will have to be admitted. [facility]        REASSESSMENT          CRITICAL CARE TIME   Total Critical Care time was 0 minutes, excluding separately reportable procedures. There was a high probability of clinically significant/life threatening deterioration in the patient's condition which required my urgent intervention. CONSULTS:  None    PROCEDURES:  Unless otherwise noted below, none     Procedures        FINAL IMPRESSION      1. General weakness    2. Anemia, unspecified type    3. Parkinson's disease ([facility] [address]    4. Gait disturbance    5. Fatigue, unspecified type          DISPOSITION/PLAN   DISPOSITION Admitted 12/30/2022 03:41:52 PM      PATIENT REFERRED TO:  No follow-up provider specified. DISCHARGE MEDICATIONS:  New Prescriptions    No medications on file     Controlled Substances Monitoring:     RX Monitoring 11/8/2021   Acute Pain Prescriptions -   Periodic Controlled Substance Monitoring Possible medication side effects, risk of tolerance/dependence & alternative treatments discussed. ;No signs of potential drug abuse or diversion identified. ;Assessed functional status. ;Obtaining appropriate analgesic effect of treatment.        (Please note that portions of this note were completed with a voice recognition program.  Efforts were made to edit the dictations but occasionally words are mis-transcribed.)    Thaddeus Urban DO (electronically signed)  Attending Emergency Physician            Thaddeus Urban DO  12/30/22 6941 Calcipotriene Pregnancy And Lactation Text: This medication has not been proven safe during pregnancy. It is unknown if this medication is excreted in breast milk.

## 2023-02-20 NOTE — PROCEDURE: ADDITIONAL NOTES
Detail Level: Simple
Additional Notes: Next time pt needs refill prescribe ointment instead of cream
Render Risk Assessment In Note?: no
Additional Notes: The pelvic exam has been discussed with the patient and the patient's consent is documented.

## 2023-02-20 NOTE — PROCEDURE: MEDICATION COUNSELING
Patient called the office asking if he can have his CT results and the recommended PET scan in December details emailed to him via a letter.    Colchicine Counseling:  Patient counseled regarding adverse effects including but not limited to stomach upset (nausea, vomiting, stomach pain, or diarrhea).  Patient instructed to limit alcohol consumption while taking this medication.  Colchicine may reduce blood counts especially with prolonged use.  The patient understands that monitoring of kidney function and blood counts may be required, especially at baseline. The patient verbalized understanding of the proper use and possible adverse effects of colchicine.  All of the patient's questions and concerns were addressed.

## 2024-11-01 NOTE — PROCEDURE: MEDICATION COUNSELING
99 Dapsone Counseling: I discussed with the patient the risks of dapsone including but not limited to hemolytic anemia, agranulocytosis, rashes, methemoglobinemia, kidney failure, peripheral neuropathy, headaches, GI upset, and liver toxicity.  Patients who start dapsone require monitoring including baseline LFTs and weekly CBCs for the first month, then every month thereafter.  The patient verbalized understanding of the proper use and possible adverse effects of dapsone.  All of the patient's questions and concerns were addressed.

## 2025-07-05 NOTE — PROCEDURE: MEDICATION COUNSELING
Problem: Pain  Goal: Acceptable pain level achieved/maintained at rest using appropriate pain scale for the patient  Outcome: Monitoring/Evaluating progress  Goal: Acceptable pain level achieved/maintained with activity using appropriate pain scale for the patient  Outcome: Monitoring/Evaluating progress  Goal: Acceptable pain level achieved/maintained without oversedation  Outcome: Monitoring/Evaluating progress     Problem: At Risk for Falls  Goal: Patient does not fall  Outcome: Monitoring/Evaluating progress  Goal: Patient takes action to control fall-related risks  Outcome: Monitoring/Evaluating progress     Problem: At Risk for Injury Due to Fall  Goal: Patient does not fall  Outcome: Monitoring/Evaluating progress  Goal: Takes action to control condition specific risks  Outcome: Monitoring/Evaluating progress  Goal: Verbalizes understanding of fall-related injury personal risks  Description: Document education using the patient education activity  Outcome: Monitoring/Evaluating progress     Problem: Diabetes  Goal: Achieves glycemic balance  Description: Goal is to maintain blood sugar within range with no episodes of hypoglycemia  Outcome: Monitoring/Evaluating progress  Goal: Verbalizes/demonstrates understanding of NEW diagnosis of diabetes and management  Description: Document on Patient Education Activity  Outcome: Monitoring/Evaluating progress  Goal: Verbalizes understanding of diabetes management including how to use HbA1C to evaluate status of blood sugar over time (Diabetes is NOT a new diagnosis)  Description: Diabetes Education  Outcome: Monitoring/Evaluating progress  Goal: Demonstrates ability to self-administer insulin  Description: Document on Patient Education Activity  Outcome: Monitoring/Evaluating progress     Problem: Skin Integrity Alteration  Goal: Participates in wound care activities  Outcome: Monitoring/Evaluating progress      Rifampin Counseling: I discussed with the patient the risks of rifampin including but not limited to liver damage, kidney damage, red-orange body fluids, nausea/vomiting and severe allergy.